# Patient Record
Sex: MALE | Race: WHITE | URBAN - METROPOLITAN AREA
[De-identification: names, ages, dates, MRNs, and addresses within clinical notes are randomized per-mention and may not be internally consistent; named-entity substitution may affect disease eponyms.]

---

## 2022-04-11 ENCOUNTER — OFFICE VISIT (OUTPATIENT)
Dept: URBAN - METROPOLITAN AREA CLINIC 13 | Facility: CLINIC | Age: 77
End: 2022-04-11
Payer: MEDICARE

## 2022-04-11 DIAGNOSIS — Z96.1 PRESENCE OF INTRAOCULAR LENS: ICD-10-CM

## 2022-04-11 DIAGNOSIS — H17.9 CORNEAL SCAR: ICD-10-CM

## 2022-04-11 DIAGNOSIS — H35.372 PUCKERING OF MACULA, LEFT EYE: Primary | ICD-10-CM

## 2022-04-11 DIAGNOSIS — E11.9 TYPE 2 DIABETES MELLITUS WITHOUT COMPLICATIONS: ICD-10-CM

## 2022-04-11 PROCEDURE — 99204 OFFICE O/P NEW MOD 45 MIN: CPT | Performed by: OPHTHALMOLOGY

## 2022-04-11 PROCEDURE — 92134 CPTRZ OPH DX IMG PST SGM RTA: CPT | Performed by: OPHTHALMOLOGY

## 2022-04-11 ASSESSMENT — INTRAOCULAR PRESSURE
OD: 15
OS: 12

## 2022-04-11 NOTE — IMPRESSION/PLAN
Impression: Puckering of macula, left eye: H35.372. OCT OU = no SRF/IRF OD nasal ERM and RPE changes, with good contour OS  / 319  Plan: The patient has an ERM in the setting of corneal haze and good mac contour and thus we will observe for now. Patient knows to call with any decrease in vision and increase in metamorphopsia. 

12m OCT OU

## 2022-04-11 NOTE — IMPRESSION/PLAN
Impression: Type 2 diabetes mellitus without complications: V11.0. Plan: Thankfully, no ophthalmic manifestations at this time. No DME on OCT. BS/BP control emphasized with patient.

## 2023-04-17 ENCOUNTER — OFFICE VISIT (OUTPATIENT)
Dept: URBAN - METROPOLITAN AREA CLINIC 13 | Facility: CLINIC | Age: 78
End: 2023-04-17
Payer: MEDICARE

## 2023-04-17 DIAGNOSIS — E11.9 TYPE 2 DIABETES MELLITUS WITHOUT COMPLICATIONS: ICD-10-CM

## 2023-04-17 DIAGNOSIS — H35.372 PUCKERING OF MACULA, LEFT EYE: Primary | ICD-10-CM

## 2023-04-17 DIAGNOSIS — H17.9 CORNEAL SCAR: ICD-10-CM

## 2023-04-17 DIAGNOSIS — Z96.1 PRESENCE OF INTRAOCULAR LENS: ICD-10-CM

## 2023-04-17 PROCEDURE — 99214 OFFICE O/P EST MOD 30 MIN: CPT | Performed by: OPHTHALMOLOGY

## 2023-04-17 PROCEDURE — 92134 CPTRZ OPH DX IMG PST SGM RTA: CPT | Performed by: OPHTHALMOLOGY

## 2023-04-17 ASSESSMENT — INTRAOCULAR PRESSURE
OS: 19
OD: 12

## 2023-04-17 NOTE — IMPRESSION/PLAN
Impression: Puckering of macula, left eye: H35.372. OCT OU = no SRF/IRF OD nasal ERM and RPE changes, with good contour OS  / 310  Plan: The patient has a stable ERM in the setting of corneal haze, good mac contour, and epsiodes of good vision, and thus we will observe for now. Patient knows to call with any decrease in vision and increase in metamorphopsia. 

12m OCT OU

## 2023-04-17 NOTE — IMPRESSION/PLAN
Impression: Type 2 diabetes mellitus without complications: Z79.5. Plan: Thankfully, no ophthalmic manifestations at this time. No DME on OCT. BS/BP control emphasized with patient.